# Patient Record
Sex: FEMALE | Race: WHITE | NOT HISPANIC OR LATINO | URBAN - METROPOLITAN AREA
[De-identification: names, ages, dates, MRNs, and addresses within clinical notes are randomized per-mention and may not be internally consistent; named-entity substitution may affect disease eponyms.]

---

## 2019-12-03 ENCOUNTER — IMPORTED ENCOUNTER (OUTPATIENT)
Dept: URBAN - METROPOLITAN AREA CLINIC 31 | Facility: CLINIC | Age: 74
End: 2019-12-03

## 2019-12-03 PROBLEM — H43.813: Noted: 2019-12-03

## 2019-12-03 PROBLEM — H00.025: Noted: 2019-12-03

## 2019-12-03 PROBLEM — Z96.1: Noted: 2019-12-03

## 2019-12-03 PROBLEM — H00.15: Noted: 2019-12-03

## 2019-12-03 PROCEDURE — 92004 COMPRE OPH EXAM NEW PT 1/>: CPT

## 2019-12-03 PROCEDURE — 92250 FUNDUS PHOTOGRAPHY W/I&R: CPT

## 2019-12-03 PROCEDURE — 92015 DETERMINE REFRACTIVE STATE: CPT

## 2019-12-03 PROCEDURE — 92310 CONTACT LENS FITTING OU: CPT

## 2019-12-03 NOTE — PATIENT DISCUSSION
Chalazion left lower eyelid - treatment with warm compresses and antibiotic and/or steroid drops and ointment was recommended.

## 2019-12-03 NOTE — PATIENT DISCUSSION
1.  PVD OU:  Patient was cautioned to call our office immediately if they experience a substantial change in their symptoms such as an increase in floaters persistent flashes loss of visual field (may appear as a shadow or a curtain) or decrease in visual acuity as these may indicate a retinal tear or detachment. If this is a new problem patient will need to return for re-examination  as determined by the [address]. Pseudophakia OU - IOLs stable. Monitor. 3. WC massage antibiotic ung4.   Chalazion left lower eyelid - Eval with Dr. Maximiliano Gao

## 2019-12-20 ENCOUNTER — IMPORTED ENCOUNTER (OUTPATIENT)
Dept: URBAN - METROPOLITAN AREA CLINIC 31 | Facility: CLINIC | Age: 74
End: 2019-12-20

## 2019-12-20 PROBLEM — H02.413: Noted: 2019-12-20

## 2019-12-20 PROBLEM — H00.15: Noted: 2019-12-20

## 2019-12-20 PROCEDURE — 99213 OFFICE O/P EST LOW 20 MIN: CPT

## 2019-12-20 NOTE — PATIENT DISCUSSION
1.  Chalazion left lower eyelid - Monitor may consider excision. brow ptosis and UL dermatochalasis  bilaterally which intefers with vision. Surgical correction of the ptosis was recommended. Consider chalazion drainage at the time of brow/bleph surgeryPatient will call to schedule if she desires will need Ptosis VF and Photos. Keep scheduled appt w/ Dr Marly Sadler.

## 2020-11-17 ENCOUNTER — IMPORTED ENCOUNTER (OUTPATIENT)
Dept: URBAN - METROPOLITAN AREA CLINIC 31 | Facility: CLINIC | Age: 75
End: 2020-11-17

## 2020-11-17 PROBLEM — H02.834: Noted: 2020-11-17

## 2020-11-17 PROBLEM — H02.831: Noted: 2020-11-17

## 2020-11-17 PROCEDURE — 99213 OFFICE O/P EST LOW 20 MIN: CPT

## 2020-11-17 PROCEDURE — 92082 INTERMEDIATE VISUAL FIELD XM: CPT

## 2020-11-17 PROCEDURE — 92285 EXTERNAL OCULAR PHOTOGRAPHY: CPT

## 2020-11-17 NOTE — PATIENT DISCUSSION
Dermatochalasis OU: Schedule bilateral upper eyelid blepharoplasty. Needs to stop ASA - cardiology clearance. Has defibrillator\Would also benefit from bilateral cosmetic brow lift. Not interested now.  She understands

## 2020-12-17 ENCOUNTER — IMPORTED ENCOUNTER (OUTPATIENT)
Dept: URBAN - METROPOLITAN AREA CLINIC 31 | Facility: CLINIC | Age: 75
End: 2020-12-17

## 2020-12-17 PROBLEM — Z98.89: Noted: 2020-12-17

## 2020-12-17 PROCEDURE — 99024 POSTOP FOLLOW-UP VISIT: CPT

## 2020-12-17 NOTE — PATIENT DISCUSSION
Post Operative:  Doing well po drops as instructed tears prn. Call with any problems. POst op f/u 1 month at [facility]

## 2020-12-29 ENCOUNTER — IMPORTED ENCOUNTER (OUTPATIENT)
Dept: URBAN - METROPOLITAN AREA CLINIC 31 | Facility: CLINIC | Age: 75
End: 2020-12-29

## 2020-12-29 PROBLEM — Z96.1: Noted: 2020-12-29

## 2020-12-29 PROBLEM — H43.813: Noted: 2020-12-29

## 2020-12-29 PROCEDURE — 92250 FUNDUS PHOTOGRAPHY W/I&R: CPT

## 2020-12-29 PROCEDURE — 92014 COMPRE OPH EXAM EST PT 1/>: CPT

## 2020-12-29 PROCEDURE — 92310 CONTACT LENS FITTING OU: CPT

## 2020-12-29 PROCEDURE — 92015 DETERMINE REFRACTIVE STATE: CPT

## 2020-12-29 NOTE — PATIENT DISCUSSION
1.  Pseudophakia OU - IOLs stable. Monitor for changes in vision. 2. PVD OU:  Patient was cautioned to call our office immediately if they experience a substantial change in their symptoms such as an increase in floaters persistent flashes loss of visual field (may appear as a shadow or a curtain) or decrease in visual acuity as these may indicate a retinal tear or detachment. If this is a new problem patient will need to return for re-examination  as determined by the [address] CPCLs.

## 2021-01-15 ENCOUNTER — IMPORTED ENCOUNTER (OUTPATIENT)
Dept: URBAN - METROPOLITAN AREA CLINIC 31 | Facility: CLINIC | Age: 76
End: 2021-01-15

## 2021-01-15 PROBLEM — Z98.89: Noted: 2021-01-15

## 2021-01-15 PROCEDURE — 99024 POSTOP FOLLOW-UP VISIT: CPT

## 2021-01-15 NOTE — PATIENT DISCUSSION
Post Operative: S/P Bilateral Upper Blephroplasty. Doing well po drops as instructed tears prn. Call with any problems. incisions healing well patient happyroutine LFA

## 2022-04-02 ASSESSMENT — VISUAL ACUITY
OS_GLARE: 20/25-2
OS_CC: 20/40
OD_CC: 20/30-2
OS_CC: 20/30-1
OD_CC: 20/30+2
OS_CC: 20/20
OD_CC: 20/20-2
OS_CC: J1+17"
OS_CC: 20/25-2
OU_CC: 20/20
OD_CC: 20/20-3
OS_CC: J117''
OD_CC: 20/20-2
OS_CC: 20/20-1
OS_SC: 20/60+2
OD_CC: 20/25+1
OU_CC: J117''

## 2022-04-02 ASSESSMENT — TONOMETRY
OD_IOP_MMHG: 11
OS_IOP_MMHG: 12
OD_IOP_MMHG: 11
OS_IOP_MMHG: 11

## 2022-07-30 ENCOUNTER — TELEPHONE ENCOUNTER (OUTPATIENT)
Age: 77
End: 2022-07-30

## 2022-07-31 ENCOUNTER — TELEPHONE ENCOUNTER (OUTPATIENT)
Age: 77
End: 2022-07-31

## 2023-10-23 ENCOUNTER — COMPREHENSIVE EXAM (OUTPATIENT)
Dept: URBAN - METROPOLITAN AREA CLINIC 31 | Facility: CLINIC | Age: 78
End: 2023-10-23

## 2023-10-23 DIAGNOSIS — H26.493: ICD-10-CM

## 2023-10-23 DIAGNOSIS — Z97.3: ICD-10-CM

## 2023-10-23 DIAGNOSIS — Z96.1: ICD-10-CM

## 2023-10-23 PROCEDURE — 92015 DETERMINE REFRACTIVE STATE: CPT

## 2023-10-23 PROCEDURE — 92014 COMPRE OPH EXAM EST PT 1/>: CPT

## 2023-10-23 ASSESSMENT — VISUAL ACUITY
OS_SC: J1
OD_SC: 20/25-1

## 2023-10-23 ASSESSMENT — TONOMETRY
OS_IOP_MMHG: 14
OD_IOP_MMHG: 14